# Patient Record
(demographics unavailable — no encounter records)

---

## 2025-01-06 NOTE — ASSESSMENT
[FreeTextEntry1] : possible passed stone  now feels well   Recommended Kidney stone prevention diet: - Good oral hydration so that urine is clear to light yellow, usually 1.5 to 2 Liters of fluids, mainly water - Less red meat - Less salt - Limit foods with oxalate like- dark green vegetables, rhubarb, chocolate, wheat bran, nuts, cranberries, and beans - Increase citrate in diet by consuming citrus fruits and juices. Discussed that martell and limes have the most citric acid, oranges, grapefruits, and berries also contain appreciable amounts.  ct images reviewed will get repeat ct w/wo contrast to better eval renal lesion      Patient is being seen today for evaluation and management of a chronic and longitudinal ongoing condition and I am of the primary treating physician.

## 2025-01-06 NOTE — HISTORY OF PRESENT ILLNESS
[FreeTextEntry1] :  - Chief Complaint: The patient is a 32y Male complaining of urinary retention. - HPI Objective Statement: 32-year-old male presents for abrupt onset flank pain and urinary retention. Patient reports 2 PM feeling right flank pain and since then states he has been unable to urinate. Patient reports he had a kidney stone in the past that feels similar and he states "broke apart with pills. Patient denies fevers headache neck pain chest pain difficulty breathing dysuria or hematuria.  ct   IMPRESSION: 1. Indeterminate exophytic RIGHT lower pole renal lesion measuring 1.7 cm. Nonemergent dedicated renal CT without/with IV contrast or a minimum renal ultrasound is recommended.

## 2025-03-11 NOTE — HISTORY OF PRESENT ILLNESS
[FreeTextEntry1] : 12/31/2024 - Chief Complaint: The patient is a 32y Male complaining of urinary retention. - HPI Objective Statement: 32-year-old male presents for abrupt onset flank pain and urinary retention. Patient reports 2 PM feeling right flank pain and since then states he has been unable to urinate. Patient reports he had a kidney stone in the past that feels similar and he states "broke apart with pills. Patient denies fevers headache neck pain chest pain difficulty breathing dysuria or hematuria.  ct IMPRESSION: 1. Indeterminate exophytic RIGHT lower pole renal lesion measuring 1.7 cm. Nonemergent dedicated renal CT without/with IV contrast or a minimum renal ultrasound is recommended.  03/06/2025 cc r f/u visit for right lower pole renal lesion. Pt is a 32 year old male presenting for f/u visit for right lower pole renal lesion. Reviewed with pt CT Abdomen w/wo IV Cont  02/06/2025. IMPRESSION: Small exophytic complex or proteinaceous cyst arising from the posterior lower pole of the right kidney, overall increase in size since March 2018. Consider dedicated renal ultrasound for further characterization.

## 2025-03-11 NOTE — ASSESSMENT
[FreeTextEntry1] : Pt is a 32 year old male.  Reviewed Ct imaging with pt. IMPRESSION: Small exophytic complex or proteinaceous cyst arising from the posterior lower pole of the right kidney, overall increase in size since March 2018. Consider dedicated renal ultrasound for further characterization . Explained to pt that his lesion is a proteinaceous cyst which is less than 1.5 cm.  Discussed with pt the possibility of having an MRI completed to fully visualize the cyst.  Advised pt to have f/u imaging in 6 months.     Patient is being seen today for evaluation and management of a chronic and longitudinal ongoing condition and I am of the primary treating physician.

## 2025-03-11 NOTE — END OF VISIT
[FreeTextEntry4] : This note was written by Marianela Mcgraw on 03/06/2025 actively solely Helder Solorio M.D. I, Marianela Mcgraw, am scribing for and in the presence of Helder Solorio M.D. in the following sections HISTORY OF PRESENT ILLNESS, PAST MEDICAL/FAMILY/SOCIAL HISTORY; REVIEW OF SYSTEMS; VITAL SIGNS; PHYSICAL EXAM; ASSESSMENT/PLAN. All medical record entries made by this scribe at , Helder Solorio M.D. direction and personally dictated by me on 03/06/2025. I personally performed the services described in the documentation, reviewed the documentation recorded by the scribe in my presence, and it accurately and completely records my words and actions.  [Time Spent: ___ minutes] : I have spent [unfilled] minutes of time on the encounter which excludes teaching and separately reported services.